# Patient Record
Sex: FEMALE
[De-identification: names, ages, dates, MRNs, and addresses within clinical notes are randomized per-mention and may not be internally consistent; named-entity substitution may affect disease eponyms.]

---

## 2017-11-22 ENCOUNTER — HOSPITAL ENCOUNTER (OUTPATIENT)
Dept: HOSPITAL 14 - H.ENDO | Age: 72
Discharge: HOME | End: 2017-11-22
Attending: INTERNAL MEDICINE
Payer: MEDICARE

## 2017-11-22 VITALS — TEMPERATURE: 97.5 F

## 2017-11-22 VITALS
SYSTOLIC BLOOD PRESSURE: 134 MMHG | OXYGEN SATURATION: 99 % | RESPIRATION RATE: 20 BRPM | HEART RATE: 63 BPM | DIASTOLIC BLOOD PRESSURE: 84 MMHG

## 2017-11-22 VITALS — BODY MASS INDEX: 29.2 KG/M2

## 2017-11-22 DIAGNOSIS — K62.5: Primary | ICD-10-CM

## 2017-11-22 DIAGNOSIS — I10: ICD-10-CM

## 2017-11-22 DIAGNOSIS — D12.3: ICD-10-CM

## 2017-11-22 DIAGNOSIS — D12.7: ICD-10-CM

## 2017-11-22 DIAGNOSIS — D12.2: ICD-10-CM

## 2017-11-22 DIAGNOSIS — D12.5: ICD-10-CM

## 2017-11-22 PROCEDURE — 88305 TISSUE EXAM BY PATHOLOGIST: CPT

## 2017-11-22 PROCEDURE — 45385 COLONOSCOPY W/LESION REMOVAL: CPT

## 2018-11-08 ENCOUNTER — HOSPITAL ENCOUNTER (OUTPATIENT)
Dept: HOSPITAL 31 - C.ER | Age: 73
Setting detail: OBSERVATION
LOS: 1 days | Discharge: HOME | End: 2018-11-09
Attending: FAMILY MEDICINE | Admitting: FAMILY MEDICINE
Payer: MEDICARE

## 2018-11-08 VITALS — BODY MASS INDEX: 29.2 KG/M2

## 2018-11-08 DIAGNOSIS — M81.0: ICD-10-CM

## 2018-11-08 DIAGNOSIS — Z85.3: ICD-10-CM

## 2018-11-08 DIAGNOSIS — E78.5: ICD-10-CM

## 2018-11-08 DIAGNOSIS — F41.9: ICD-10-CM

## 2018-11-08 DIAGNOSIS — Z87.891: ICD-10-CM

## 2018-11-08 DIAGNOSIS — I10: ICD-10-CM

## 2018-11-08 DIAGNOSIS — J44.1: Primary | ICD-10-CM

## 2018-11-08 LAB
ALBUMIN SERPL-MCNC: 4.3 G/DL (ref 3.5–5)
ALBUMIN/GLOB SERPL: 1.5 {RATIO} (ref 1–2.1)
ALT SERPL-CCNC: 32 U/L (ref 9–52)
AST SERPL-CCNC: 25 U/L (ref 14–36)
BASOPHILS # BLD AUTO: 0.1 K/UL (ref 0–0.2)
BASOPHILS NFR BLD: 0.9 % (ref 0–2)
BNP SERPL-MCNC: 47.1 PG/ML (ref 0–900)
BUN SERPL-MCNC: 14 MG/DL (ref 7–17)
CALCIUM SERPL-MCNC: 9.1 MG/DL (ref 8.6–10.4)
EOSINOPHIL # BLD AUTO: 0.8 K/UL (ref 0–0.7)
EOSINOPHIL NFR BLD: 13.8 % (ref 0–4)
ERYTHROCYTE [DISTWIDTH] IN BLOOD BY AUTOMATED COUNT: 14.2 % (ref 11.5–14.5)
GFR NON-AFRICAN AMERICAN: > 60
HGB BLD-MCNC: 14.3 G/DL (ref 11–16)
LYMPHOCYTES # BLD AUTO: 1.6 K/UL (ref 1–4.3)
LYMPHOCYTES NFR BLD AUTO: 28.3 % (ref 20–40)
MCH RBC QN AUTO: 33.2 PG (ref 27–31)
MCHC RBC AUTO-ENTMCNC: 34.1 G/DL (ref 33–37)
MCV RBC AUTO: 97.2 FL (ref 81–99)
MONOCYTES # BLD: 0.6 K/UL (ref 0–0.8)
MONOCYTES NFR BLD: 11.2 % (ref 0–10)
NEUTROPHILS # BLD: 2.5 K/UL (ref 1.8–7)
NEUTROPHILS NFR BLD AUTO: 45.8 % (ref 50–75)
NRBC BLD AUTO-RTO: 0.1 % (ref 0–2)
PLATELET # BLD: 244 K/UL (ref 130–400)
PMV BLD AUTO: 7.9 FL (ref 7.2–11.7)
RBC # BLD AUTO: 4.32 MIL/UL (ref 3.8–5.2)
WBC # BLD AUTO: 5.6 K/UL (ref 4.8–10.8)

## 2018-11-08 PROCEDURE — 94640 AIRWAY INHALATION TREATMENT: CPT

## 2018-11-08 PROCEDURE — 80053 COMPREHEN METABOLIC PANEL: CPT

## 2018-11-08 PROCEDURE — 84484 ASSAY OF TROPONIN QUANT: CPT

## 2018-11-08 PROCEDURE — 85025 COMPLETE CBC W/AUTO DIFF WBC: CPT

## 2018-11-08 PROCEDURE — 93005 ELECTROCARDIOGRAM TRACING: CPT

## 2018-11-08 PROCEDURE — 84100 ASSAY OF PHOSPHORUS: CPT

## 2018-11-08 PROCEDURE — 71045 X-RAY EXAM CHEST 1 VIEW: CPT

## 2018-11-08 PROCEDURE — 83735 ASSAY OF MAGNESIUM: CPT

## 2018-11-08 PROCEDURE — 83880 ASSAY OF NATRIURETIC PEPTIDE: CPT

## 2018-11-08 PROCEDURE — 96374 THER/PROPH/DIAG INJ IV PUSH: CPT

## 2018-11-08 PROCEDURE — 36415 COLL VENOUS BLD VENIPUNCTURE: CPT

## 2018-11-08 PROCEDURE — 99285 EMERGENCY DEPT VISIT HI MDM: CPT

## 2018-11-08 RX ADMIN — METHYLPREDNISOLONE SODIUM SUCCINATE SCH MG: 40 INJECTION, POWDER, FOR SOLUTION INTRAMUSCULAR; INTRAVENOUS at 12:08

## 2018-11-08 RX ADMIN — METHYLPREDNISOLONE SODIUM SUCCINATE SCH MG: 40 INJECTION, POWDER, FOR SOLUTION INTRAMUSCULAR; INTRAVENOUS at 21:42

## 2018-11-08 RX ADMIN — ALBUTEROL SULFATE SCH: 90 AEROSOL, METERED RESPIRATORY (INHALATION) at 09:00

## 2018-11-08 RX ADMIN — METHYLPREDNISOLONE SODIUM SUCCINATE SCH MG: 40 INJECTION, POWDER, FOR SOLUTION INTRAMUSCULAR; INTRAVENOUS at 06:23

## 2018-11-08 NOTE — RAD
Date of service: 



11/08/2018



PROCEDURE:  CHEST RADIOGRAPH, 1 VIEW



HISTORY:

SOB



COMPARISON:

6/12/2015



FINDINGS:



LUNGS:

Clear.  Study markedly rotated towards the right-accentuating left 

infrahilar bronchovascular markings 



PLEURA:

No pneumothorax or pleural fluid seen.



CARDIOVASCULAR:

There is presence of aortic atherosclerotic calcification on x-ray.  

Heart size normal 



No pulmonary venous congestion left subclavian port tip superior vena 

cava



OSSEOUS STRUCTURES:

Cervical apophyseal joint arthrosis.  Thoracic spondylosis.



VISUALIZED UPPER ABDOMEN:

Normal.



OTHER FINDINGS:

None. 



IMPRESSION:

No interval pathology noted.

## 2018-11-08 NOTE — CP.PCM.PN
<Terry Valladares - Last Filed: 11/08/18 19:59>





Subjective





- Date & Time of Evaluation


Date of Evaluation: 11/08/18


Time of Evaluation: 07:42





- Subjective


Subjective: 





Progress note for Hospitalist service 





Patient seen and examined at bedside. She states that she no longer feels short 

of breath but continues to have a cough. She states she would like to go home as

soon as possible since she helps to care for her son. She states that all her 

symptoms with her cough started after she got sick at a wedding in [place].

She states that she has had stress incontinence when she coughs. She denies 

fevers, chills, dizziness, lightheadedness, chest pain, palpitations, abdominal 

pain, nausea, vomiting ,diarrhea, calf pain or swelling. 





Objective





- Vital Signs/Intake and Output


Vital Signs (last 24 hours): 


                                        











Temp Pulse Resp BP Pulse Ox


 


 97.8 F   100 H  20   105/59 L  95 


 


 11/08/18 04:22  11/08/18 05:24  11/08/18 04:22  11/08/18 04:22  11/08/18 05:08











- Medications


Medications: 


                               Current Medications





Albuterol (Ventolin Hfa 90 Mcg/Actuation (8 G))  2 puff INH RQ6 [facility]


Albuterol/Ipratropium (Duoneb 3 Mg/0.5 Mg (3 Ml) Ud)  3 ml INH RQ4 PRN


   PRN Reason: Shortness of Breath


Amlodipine Besylate (Norvasc)  5 mg PO DAILY [facility]


Anastrozole (Arimidex 1 Mg Tab)  1 mg PO DAILY [facility]


Aspirin (Aspirin Chewable)  81 mg PO DAILY [facility]


Cyanocobalamin (Vitamin B12 100 Mcg Tab)  100 mcg PO DAILY [facility]


Fluticasone/Vilanterol (Breo Ellipta 100-25 Mcg Inh)  1 puff INH RQD [facility]


Heparin Sodium (Porcine) (Heparin)  5,000 units SC Q8 [facility]


   Last Admin: 11/08/18 06:24 Dose:  5,000 units


Influenza Virus Vaccine (Fluzone Quad [id number])  60 mcg IM .ONCE ONE


   Stop: 11/10/18 10:01


Lorazepam (Ativan)  1 mg PO Q6H PRN; Taper


   PRN Reason: EtOH Withdrawall


   Stop: 11/13/18 07:59


Methylprednisolone (Solu-Medrol)  40 mg IVP Q6 [facility]


   Last Admin: 11/08/18 06:23 Dose:  40 mg


Montelukast Sodium (Singulair)  10 mg PO HS [facility]


Naproxen (Anaprox)  275 mg PO DAILY PRN


   PRN Reason: Pain, moderate (4-7)


Rosuvastatin Calcium (Crestor)  5 mg PO HS [facility]


Thiamine HCl (Vitamin B1 Tab)  100 mg PO DAILY [facility]


Vitamin B Complex/Folic Acid (Berroca)  1 tab PO DAILY [facility]











- Labs


Labs: 


                                        





                                 11/08/18 01:51 





                                 11/08/18 01:51 











- Constitutional


Appears: No Acute Distress





- Head Exam


Head Exam: ATRAUMATIC, NORMAL INSPECTION





- Eye Exam


Eye Exam: EOMI





- ENT Exam


ENT Exam: Mucous Membranes Dry





- Neck Exam


Neck Exam: Full ROM





- Respiratory Exam


Respiratory Exam: Decreased Breath Sounds.  absent: Rales, Rhonchi, Wheezes, Res

piratory Distress, Stridor





- Cardiovascular Exam


Cardiovascular Exam: REGULAR RHYTHM, +S1, +S2.  absent: Gallop, Rubs, Murmur





- GI/Abdominal Exam


GI & Abdominal Exam: Soft, Normal Bowel Sounds.  absent: Firm, Guarding, Rigid, 

Tenderness, Organomegaly





- Extremities Exam


Extremities Exam: Normal Capillary Refill.  absent: Calf Tenderness, Pedal 

Edema, Tenderness





- Back Exam


Back Exam: absent: CVA tenderness (L), CVA tenderness (R)





- Neurological Exam


Neurological Exam: Alert, Awake, Oriented x3





- Psychiatric Exam


Psychiatric exam: Normal Affect, Normal Mood





- Skin


Skin Exam: Dry, Intact, Warm


Additional comments: 





subdermal port on left upper chest





Assessment and Plan





- Assessment and Plan (Free Text)


Plan: 





Plan: 


73 year old female with PMHx of COPD, HTN, HLD, spinal stenosis, and R breast 

cancer status admitted for COPD exacerbation. 





Shortness of breath, likely COPD Exacerbation


- Duonebs RQ4H PRN


- Solumedrol 40mg IVP Q8H


- Breo Ellipta 1puff daily, home med


- Albuterol HFA 2 puff INH RQ6, home med


- Montelukast 10mg PO HS, home med


- Zithromax 500mg IV Q24 





Hypertension


- Amlodipine 5mg PO daily, home med


- Enalapril 10mg daily, substitute for home med of Benazepril 20mg daily


- ASA 81mg PO





Hyperlipidemia


- Crestor 5mg PO HS, substitute for home med of Atorvastatin 10mg daily





Hx of R Breast Cancer


- anastrozole 1mg daily, home med


- s/p right mastectomy 2014





Hx of Spinal Stenosis


-Naproxen 275mg PO daily PRN





Hx of Alcohol use


-3 wine spritzers daily


-Denies hx of withdrawal


-Lorazepam 1mg IVP Q6H PRN


-Thiamine 100mg PO daily


-Vitamin B complex/Folic acid 1 tab PO daily


-CIWA protocol





Prophylaxis


-Heparin 5000u SC Q8





Case discussed with Dr. Leslie Valladares, PGY1








<Mis Nelson - Last Filed: 11/09/18 16:44>





Objective





- Vital Signs/Intake and Output


Vital Signs (last 24 hours): 


                                        











Temp Pulse Resp BP Pulse Ox


 


 97.6 F   95 H  20   110/70   92 L


 


 11/09/18 08:06  11/09/18 16:03  11/09/18 08:06  11/09/18 11:01  11/09/18 13:04








Intake and Output: 


                                        











 11/09/18 11/09/18





 06:59 18:59


 


Intake Total 500 


 


Balance 500 














- Medications


Medications: 


                               Current Medications





Albuterol (Ventolin Hfa 90 Mcg/Actuation (8 G))  2 puff INH RQ6 [facility]


   Last Admin: 11/09/18 13:40 Dose:  2 puff


Albuterol/Ipratropium (Duoneb 3 Mg/0.5 Mg (3 Ml) Ud)  3 ml INH RQ4 PRN


   PRN Reason: Shortness of Breath


Amlodipine Besylate (Norvasc)  5 mg PO DAILY [facility]


   Last Admin: 11/09/18 10:58 Dose:  5 mg


Anastrozole (Arimidex 1 Mg Tab)  1 mg PO DAILY [facility]


   Last Admin: 11/09/18 11:04 Dose:  1 mg


Aspirin (Aspirin Chewable)  81 mg PO DAILY [facility]


   Last Admin: 11/09/18 10:59 Dose:  81 mg


Cyanocobalamin (Vitamin B12 100 Mcg Tab)  100 mcg PO DAILY [facility]


   Last Admin: 11/09/18 10:58 Dose:  100 mcg


Enalapril Maleate (Vasotec)  10 mg PO DAILY [facility]


   Last Admin: 11/09/18 11:01 Dose:  Not Given


Fluticasone/Vilanterol (Breo Ellipta 100-25 Mcg Inh)  1 puff INH RQD [facility]


   Last Admin: 11/09/18 07:58 Dose:  1 puff


Heparin Sodium (Porcine) (Heparin)  5,000 units SC Q8 [facility]


   Last Admin: 11/09/18 14:12 Dose:  5,000 units


Azithromycin (Zithromax 500mg In Ns Addvantage)  500 mg in 250 mls @ 167 mls/hr 

IVPB Q24H [facility]; Protocol


   Last Admin: 11/08/18 18:34 Dose:  167 mls/hr


Lorazepam (Ativan)  1 mg PO Q6 PRN; Taper


   PRN Reason: EtOH Withdrawall


   Stop: 11/13/18 07:59


Methylprednisolone (Solu-Medrol)  40 mg IVP Q8 [facility]


   Last Admin: 11/09/18 14:12 Dose:  40 mg


Montelukast Sodium (Singulair)  10 mg PO HS [facility]


   Last Admin: 11/08/18 21:41 Dose:  10 mg


Naproxen (Anaprox)  275 mg PO DAILY [facility]


   PRN Reason: Pain, moderate (4-7)


Rosuvastatin Calcium (Crestor)  5 mg PO HS [facility]


   Last Admin: 11/08/18 21:41 Dose:  5 mg


Thiamine HCl (Vitamin B1 Tab)  100 mg PO DAILY [facility]


   Last Admin: 11/09/18 10:59 Dose:  100 mg


Vitamin B Complex/Folic Acid (Berroca)  1 tab PO DAILY [facility]


   Last Admin: 11/09/18 10:58 Dose:  1 tab











- Labs


Labs: 


                                        





                                 11/09/18 07:36 





                                 11/09/18 07:36 











Attending/Attestation





- Attestation


I have personally seen and examined this patient.: Yes


I have fully participated in the care of the patient.: Yes


I have reviewed all pertinent clinical information, including history, physical 

exam and plan: Yes


Notes (Text): 


seen and examined. COPD exacerbation


continue antibiotics and solu medrol


patient is hypoxic off oxygen


continue oxygen and bronchodilators


I agree with the resident's documentation

## 2018-11-08 NOTE — CP.PCM.HP
<Roxy Rai P - Last Filed: 11/08/18 10:10>





History of Present Illness





- History of Present Illness


History of Present Illness: 


H&P for hospitalist service:





HPI:73 year old female with PMHx of COPD, HTN, HLD, and R breast cancer presents

to ED with complaint of cough for the past month after exposure to a family 

member with a URI. Cough is productive, initially green sputum, now clear. Cough

worsens with laying down and on exertion. Associated symptoms include headache, 

post-tussive emesis, and bilateral leg weakness. Patient states she is 

chronically short of breath. Patient has been to her PMD twice in the past month

for these symptoms and has been prescribed nebulizer treatments and cough syrup,

as well as tried numerous OTC medication without improvement. Patient denies fev

er, chills, diaphoresis, dizziness, chest pain, palpitations, cough, and 

abdominal pain.





PMHx: COPD, HTN, HLD, R breast cancer s/p mastectomy, spinal stenosis


PSHx: R mastectomy 2014, cataract surgery b/l


Meds: Atorvastatin 10mg once daily, Montelukast 10mg once a day, 

amlodipine/Benazepril 5/50mg once daily, Anastrozole 10mg PO daily, Breo Ellipta

110mcg/25 mcg one puff daily, Ventalin inhaler 2puffs Q6H, L-Lysine 100mg, B 

complex vatamin, Vitamin D 800mg daily, Magnesium 200mg daily, Ecotrin 81mg 

Daily, Vatamin D 25mg daily, Aleve gels 440mg once daily for pain. 


Allergies: NKDA


Family Hx: Breast cancer in mother and sisters, 


Social: former smoker 1ppd for 55 years, quit 4 years ago. Drinks 3 wine 

spritzers daily, denies drug use. Retired. Walks with a walker.


PMD: Dr. TUTU Gama


Code status: Patient states she would like everything done only if there is a 

chance of recovery.


Proxy: SonDeangelo- number unknown. 








Present on Admission





- Present on Admission


Any Indicators Present on Admission: No





Review of Systems





- Constitutional


Constitutional: Headache, Weakness (bilateral leg).  absent: Chills, Excessive 

Sweating, Fever, Night Sweats





- EENT


Eyes: absent: Change in Vision, Irritation


Nose/Mouth/Throat: Dry Mouth.  absent: Nasal Congestion, Nasal Discharge, 

Bleeding Gums





- Cardiovascular


Cardiovascular: absent: Chest Pain, Chest Pain at Rest, Claudication, Pain 

Radiating to Arm/Neck/Jaw, Leg Edema





- Respiratory


Respiratory: Cough, Dyspnea, Dyspnea on Exertion.  absent: Hemoptysis





- Gastrointestinal


Gastrointestinal: Hematochezia.  absent: Bloating, Change in Bowel Habits, 

Cramping, Diarrhea, Loose Stools





- Genitourinary


Genitourinary: Urinary Incontinence (chronic).  absent: Difficulty Urinating, 

Dysuria, Hematuria





- Musculoskeletal


Musculoskeletal: absent: Joint Swelling, Myalgias, Numbness





- Integumentary


Integumentary: absent: Rash, Unusual Bruising, Jaundice





- Neurological


Neurological: absent: Abnormal Hearing, Abnormal Speech, Dizziness, Numbness, 

Focal Weakness, Headaches, Loss of Vision





Past Patient History





- Infectious Disease


Hx of Infectious Diseases: None





- Past Medical History & Family History


Past Medical History?: Yes





- Past Social History


Smoking Status: Never Smoked





- CARDIAC


Hx Hypercholesterolemia: Yes


Hx Hypertension: Yes


Hx Peripheral Edema: Yes





- PULMONARY


Hx Asthma: Yes


Hx Chronic Obstructive Pulmonary Disease (COPD): Yes


Hx Pneumonia: Yes (1964)





- NEUROLOGICAL


Hx Neurological Disorder: No





- HEENT


Hx HEENT Problems: No





- RENAL


Hx Chronic Kidney Disease: No





- ENDOCRINE/METABOLIC


Hx Endocrine Disorders: No





- HEMATOLOGICAL/ONCOLOGICAL


Hx Cancer: Yes (RT BREAST CA WITH CHEMO AND RADIATION)





- INTEGUMENTARY


Hx Dermatological Problems: No





- MUSCULOSKELETAL/RHEUMATOLOGICAL


Hx Fractures: Yes (hairline fx. foot no surgery)


Hx Osteoporosis: Yes





- GASTROINTESTINAL


Hx Gastrointestinal Disorders: No





- GENITOURINARY/GYNECOLOGICAL


Hx Genitourinary Disorders: No





- PSYCHIATRIC


Hx Anxiety: Yes


Hx Substance Use: No





- SURGICAL HISTORY


Hx Surgeries: Yes


Hx Mastectomy: Yes (RT)





- ANESTHESIA


Hx Anesthesia: Yes


Hx Anesthesia Reactions: No


Hx Malignant Hyperthermia: No





Meds


Allergies/Adverse Reactions: 


                                    Allergies











Allergy/AdvReac Type Severity Reaction Status Date / Time


 


No Known Allergies Allergy   Verified 03/28/14 13:47














Physical Exam





- Constitutional


Appears: Non-toxic, No Acute Distress, Other (smells of alcohol)





- Head Exam


Head Exam: ATRAUMATIC, NORMOCEPHALIC





- Eye Exam


Eye Exam: EOMI, Normal appearance, PERRL





- ENT Exam


ENT Exam: Mucous Membranes Dry





- Neck Exam


Neck exam: Positive for: Full Rom, Normal Inspection





- Respiratory Exam


Respiratory Exam: Decreased Breath Sounds.  absent: Rales, Rhonchi, Wheezes, 

Respiratory Distress





- Cardiovascular Exam


Cardiovascular Exam: RRR, +S1, +S2.  absent: Systolic Murmur





- GI/Abdominal Exam


GI & Abdominal Exam: Normal Bowel Sounds, Soft.  absent: Guarding, Rigid, 

Tenderness





- Extremities Exam


Extremities exam: Positive for: normal capillary refill, pedal edema (trace pit

ting edema bilater LE), pedal pulses present.  Negative for: calf tenderness, 

tenderness





- Neurological Exam


Neurological exam: Alert, CN II-XII Intact, Oriented x3





- Psychiatric Exam


Psychiatric exam: Normal Affect, Normal Mood





- Skin


Skin Exam: Dry, Intact, Normal Color, Warm





Results





- Vital Signs


Recent Vital Signs: 





                                Last Vital Signs











Temp  97.8 F   11/08/18 04:22


 


Pulse  91 H  11/08/18 04:22


 


Resp  20   11/08/18 04:22


 


BP  105/59 L  11/08/18 04:22


 


Pulse Ox  97   11/08/18 04:34














- Labs


Result Diagrams: 


                                 11/08/18 01:51





                                 11/08/18 01:51


Labs: 





                         Laboratory Results - last 24 hr











  11/08/18 11/08/18





  01:51 01:51


 


WBC  5.6 


 


RBC  4.32 


 


Hgb  14.3 


 


Hct  42.0 


 


MCV  97.2 


 


MCH  33.2 H 


 


MCHC  34.1 


 


RDW  14.2 


 


Plt Count  244 


 


MPV  7.9 


 


Neut % (Auto)  45.8 L 


 


Lymph % (Auto)  28.3 


 


Mono % (Auto)  11.2 H 


 


Eos % (Auto)  13.8 H 


 


Baso % (Auto)  0.9 


 


Neut # (Auto)  2.5 


 


Lymph # (Auto)  1.6 


 


Mono # (Auto)  0.6 


 


Eos # (Auto)  0.8 H 


 


Baso # (Auto)  0.1 


 


Sodium   142


 


Potassium   4.3


 


Chloride   105


 


Carbon Dioxide   25


 


Anion Gap   16


 


BUN   14


 


Creatinine   0.7


 


Est GFR ( Amer)   > 60


 


Est GFR (Non-Af Amer)   > 60


 


Random Glucose   100


 


Calcium   9.1


 


Magnesium   2.1


 


Total Bilirubin   1.2


 


AST   25


 


ALT   32


 


Alkaline Phosphatase   91


 


Troponin I   < 0.0120


 


NT-Pro-B Natriuret Pep   47.1


 


Total Protein   7.2


 


Albumin   4.3


 


Globulin   2.9


 


Albumin/Globulin Ratio   1.5














Assessment & Plan





- Assessment and Plan (Free Text)


Plan: 


73 year old female with PMHx of COPD, HTN, HLD, spinal stenosis, and R breast 

cancer admitted for COPD exacerbation. 





COPD Exacerbation


-Duonebs RQ4H PRN


-Methylprednisolone 40mg IVP Q6H


-Breo Ellipta 1puff daily, home med


-Albuterol HFA 2 puff INH RQ6, home med


-Montelukast 10mg PO HS, home med





Hypertension


-Amlodipine 5mg PO daily, home med


-Enalopril 10mg daily, substitute for home med of Benazepril 20mg daily





Hyperlipidemia


-Crestor 5mg PO HS, substitute for home med of Atorvastatin 10mg daily





Hx of R Breast Cancer


-anastrozole 1mg daily, home med


-s/p mastectomy 2014





Hx of Spinal Stenosis


-Naproxen 275mg PO daily PRN





Hx of Alcohol use


-3 wine spritzers daily


-Denies hx of withdrawal


-Lorazepam 1mg IVP Q6H PRN


-Thiamine 100mg PO daily


-Vitamin B complex/Folic acid 1 tab PO daily


-CIWA





Prophylaxis


-Heparin 5000u SC Q8


-cyanocobalamin 100mcg daily, home med


-Aspirin 81mg daily, home med





Case discussed with attending, Dr. Osborne. 


























<Silvestre Osborne - Last Filed: 11/10/18 19:15>





Results





- Vital Signs


Recent Vital Signs: 





                                Last Vital Signs











Temp  98.5 F   11/09/18 16:42


 


Pulse  85   11/09/18 16:42


 


Resp  20   11/09/18 16:42


 


BP  128/76   11/09/18 16:42


 


Pulse Ox  93 L  11/09/18 16:42














- Labs


Result Diagrams: 


                                 11/09/18 07:36





                                 11/09/18 07:36





Assessment & Plan





- Date & Time


Date: 11/10/18 (I have seen and examined the patient.  I agree with the findings

and plan of care as documented by Dr. Rai.  Patient with COPD exacerbation. 

Continue home meds.  Solumedrol.  Nebs.  Oxygen as needed.  Monitor for acute 

changes.)


Time: 19:14





Attending/Attestation





- Attestation


I have personally seen and examined this patient.: Yes


I have fully participated in the care of the patient.: Yes


I have reviewed all pertinent clinical information: Yes

## 2018-11-09 VITALS
DIASTOLIC BLOOD PRESSURE: 76 MMHG | SYSTOLIC BLOOD PRESSURE: 128 MMHG | TEMPERATURE: 98.5 F | HEART RATE: 85 BPM | OXYGEN SATURATION: 93 %

## 2018-11-09 VITALS — RESPIRATION RATE: 20 BRPM

## 2018-11-09 LAB
ALBUMIN SERPL-MCNC: 3.8 G/DL (ref 3.5–5)
ALBUMIN/GLOB SERPL: 1.4 {RATIO} (ref 1–2.1)
ALT SERPL-CCNC: 33 U/L (ref 9–52)
AST SERPL-CCNC: 17 U/L (ref 14–36)
BASOPHILS # BLD AUTO: 0 K/UL (ref 0–0.2)
BASOPHILS NFR BLD: 0.3 % (ref 0–2)
BUN SERPL-MCNC: 17 MG/DL (ref 7–17)
CALCIUM SERPL-MCNC: 8.9 MG/DL (ref 8.6–10.4)
EOSINOPHIL # BLD AUTO: 0 K/UL (ref 0–0.7)
EOSINOPHIL NFR BLD: 0 % (ref 0–4)
ERYTHROCYTE [DISTWIDTH] IN BLOOD BY AUTOMATED COUNT: 14.3 % (ref 11.5–14.5)
GFR NON-AFRICAN AMERICAN: > 60
HGB BLD-MCNC: 13.6 G/DL (ref 11–16)
LYMPHOCYTE: 8 % (ref 20–40)
LYMPHOCYTES # BLD AUTO: 0.8 K/UL (ref 1–4.3)
LYMPHOCYTES NFR BLD AUTO: 8.7 % (ref 20–40)
MCH RBC QN AUTO: 32.5 PG (ref 27–31)
MCHC RBC AUTO-ENTMCNC: 33.8 G/DL (ref 33–37)
MCV RBC AUTO: 96.1 FL (ref 81–99)
MONOCYTE: 6 % (ref 0–10)
MONOCYTES # BLD: 0.4 K/UL (ref 0–0.8)
MONOCYTES NFR BLD: 4.7 % (ref 0–10)
NEUTROPHILS # BLD: 7.7 K/UL (ref 1.8–7)
NEUTROPHILS NFR BLD AUTO: 86 % (ref 50–75)
NEUTROPHILS NFR BLD AUTO: 86.3 % (ref 50–75)
NRBC BLD AUTO-RTO: 0 % (ref 0–2)
PLATELET # BLD EST: NORMAL 10*3/UL
PLATELET # BLD: 229 K/UL (ref 130–400)
PMV BLD AUTO: 8.7 FL (ref 7.2–11.7)
RBC # BLD AUTO: 4.18 MIL/UL (ref 3.8–5.2)
RBC MORPH BLD: NORMAL
TOTAL CELLS COUNTED BLD: 100
WBC # BLD AUTO: 8.9 K/UL (ref 4.8–10.8)

## 2018-11-09 RX ADMIN — ALBUTEROL SULFATE SCH PUFF: 90 AEROSOL, METERED RESPIRATORY (INHALATION) at 13:40

## 2018-11-09 RX ADMIN — ALBUTEROL SULFATE SCH PUFF: 90 AEROSOL, METERED RESPIRATORY (INHALATION) at 07:59

## 2018-11-09 RX ADMIN — ALBUTEROL SULFATE SCH PUFF: 90 AEROSOL, METERED RESPIRATORY (INHALATION) at 01:46

## 2018-11-09 RX ADMIN — METHYLPREDNISOLONE SODIUM SUCCINATE SCH MG: 40 INJECTION, POWDER, FOR SOLUTION INTRAMUSCULAR; INTRAVENOUS at 14:12

## 2018-11-09 RX ADMIN — METHYLPREDNISOLONE SODIUM SUCCINATE SCH MG: 40 INJECTION, POWDER, FOR SOLUTION INTRAMUSCULAR; INTRAVENOUS at 05:39

## 2018-11-09 NOTE — CP.PCM.DIS
<Terry Valladares - Last Filed: 11/09/18 20:07>





Provider





- Provider


Date of Admission: 


11/08/18 04:32





Attending physician: 


Silvestre Osborne MD





Primary care physician: 


Mainor Gama MD





Time Spent in preparation of Discharge (in minutes): 35





Hospital Course





- Lab Results


Lab Results: 


                             Most Recent Lab Values











WBC  8.9 K/uL (4.8-10.8)  D 11/09/18  07:36    


 


RBC  4.18 Mil/uL (3.80-5.20)   11/09/18  07:36    


 


Hgb  13.6 g/dL (11.0-16.0)   11/09/18  07:36    


 


Hct  40.2 % (34.0-47.0)   11/09/18  07:36    


 


MCV  96.1 fL (81.0-99.0)   11/09/18  07:36    


 


MCH  32.5 pg (27.0-31.0)  H  11/09/18  07:36    


 


MCHC  33.8 g/dL (33.0-37.0)   11/09/18  07:36    


 


RDW  14.3 % (11.5-14.5)   11/09/18  07:36    


 


Plt Count  229 K/uL (130-400)   11/09/18  07:36    


 


MPV  8.7 fL (7.2-11.7)   11/09/18  07:36    


 


Neut % (Auto)  86.3 % (50.0-75.0)  H  11/09/18  07:36    


 


Lymph % (Auto)  8.7 % (20.0-40.0)  L  11/09/18  07:36    


 


Mono % (Auto)  4.7 % (0.0-10.0)   11/09/18  07:36    


 


Eos % (Auto)  0.0 % (0.0-4.0)   11/09/18  07:36    


 


Baso % (Auto)  0.3 % (0.0-2.0)   11/09/18  07:36    


 


Neut # (Auto)  7.7 K/uL (1.8-7.0)  H  11/09/18  07:36    


 


Lymph # (Auto)  0.8 K/uL (1.0-4.3)  L  11/09/18  07:36    


 


Mono # (Auto)  0.4 K/uL (0.0-0.8)   11/09/18  07:36    


 


Eos # (Auto)  0.0 K/uL (0.0-0.7)   11/09/18  07:36    


 


Baso # (Auto)  0.0 K/uL (0.0-0.2)   11/09/18  07:36    


 


Neutrophils % (Manual)  86 % (50-75)  H  11/09/18  07:36    


 


Lymphocytes % (Manual)  8 % (20-40)  L  11/09/18  07:36    


 


Monocytes % (Manual)  6 % (0-10)   11/09/18  07:36    


 


Platelet Estimate  Normal  (NORMAL)   11/09/18  07:36    


 


RBC Morphology  Normal   11/09/18  07:36    


 


Sodium  137 mmol/L (132-148)   11/09/18  07:36    


 


Potassium  4.0 mmol/L (3.6-5.2)   11/09/18  07:36    


 


Chloride  106 mmol/L ()   11/09/18  07:36    


 


Carbon Dioxide  22 mmol/L (22-30)   11/09/18  07:36    


 


Anion Gap  13  (10-20)   11/09/18  07:36    


 


BUN  17 mg/dL (7-17)   11/09/18  07:36    


 


Creatinine  0.6 mg/dL (0.7-1.2)  L  11/09/18  07:36    


 


Est GFR ( Amer)  > 60   11/09/18  07:36    


 


Est GFR (Non-Af Amer)  > 60   11/09/18  07:36    


 


Random Glucose  145 mg/dL ()  H  11/09/18  07:36    


 


Calcium  8.9 mg/dl (8.6-10.4)   11/09/18  07:36    


 


Phosphorus  4.0 mg/dL (2.5-4.5)   11/09/18  07:36    


 


Magnesium  2.3 mg/dL (1.6-2.3)   11/09/18  07:36    


 


Total Bilirubin  1.1 mg/dL (0.2-1.3)   11/09/18  07:36    


 


AST  17 U/L (14-36)   11/09/18  07:36    


 


ALT  33 U/L (9-52)   11/09/18  07:36    


 


Alkaline Phosphatase  92 U/L ()   11/09/18  07:36    


 


Troponin I  < 0.0120 ng/mL (0.00-0.120)   11/08/18  01:51    


 


NT-Pro-B Natriuret Pep  47.1 pg/mL (0-900)   11/08/18  01:51    


 


Total Protein  6.4 g/dL (6.3-8.3)   11/09/18  07:36    


 


Albumin  3.8 g/dL (3.5-5.0)   11/09/18  07:36    


 


Globulin  2.6 gm/dL (2.2-3.9)   11/09/18  07:36    


 


Albumin/Globulin Ratio  1.4  (1.0-2.1)   11/09/18  07:36    














- Hospital Course


Hospital Course: 





On admission: 


HPI:73 year old female with PMHx of COPD, HTN, HLD, and R breast cancer presents

to ED with complaint of cough for the past month after exposure to a family 

member with a URI. Cough is productive, initially green sputum, now clear. Cough

worsens with laying down and on exertion. Associated symptoms include headache, 

post-tussive emesis, and bilateral leg weakness. Patient states she is 

chronically short of breath. Patient has been to her PMD twice in the past month

for these symptoms and has been prescribed nebulizer treatments and cough syrup,

as well as tried numerous OTC medication without improvement. Patient denies 

fever, chills, diaphoresis, dizziness, chest pain, palpitations, cough, and 

abdominal pain.





Hospital course: 


Patient was admitted for evaluation of shortness of breath, likely COPD 

exacerbation. Patient was given Solumedrol, Duonebs, Breo/Ellipta, Montelukast, 

Zithromax with improvement of her symptoms. She remained afebrile and was not 

noted to have elevated white count. BNP was not elevated at 47, and troponin x1 

was negative. On discharge, she was able to walk without experiencing shortness 

of breath or drop in oxygen saturation. For her history of hypertension, she was

continued on her home dose of Amlodipine and Enalapril. She also received ASA 

81mg daily. She was treated with Crestor for her hyperlipidemia. Given her 

history of breast cancer, she was continued on Anastrozole. Patient admitted to 

drinking 3 wine spritzers daily, however did not experience any withdrawal 

symptoms during hospitalization. Patient was placed on CIWA protocol and given 

Thiamine, Folic acid. On discharge, patient was medically stable and stated she 

no longer experienced shortness of breath or chest pain. 





Discharge instructions: 


Please follow up with your primary care provider Dr. TUTU Gama in 2 weeks for 

further evaluation. You may continue your home medications. Please return to the

ED if you experience chest pain, shortness of breath, palpitations. Please 

follow up with your Oncologist Dr. Stacy for your history of breast 

cancer. 





Prescriptions: 


Zithromax 500mg by mouth once daily for 5 days. Disp # 5 (five)


Prednisone 20mg by mouth once daily for 5 days. Disp #5 (five)





Discharge Exam





- Head Exam


Head Exam: ATRAUMATIC, NORMAL INSPECTION





- Eye Exam


Eye Exam: EOMI





- ENT Exam


ENT Exam: Mucous Membranes Moist





- Respiratory Exam


Respiratory Exam: Decreased Breath Sounds.  absent: Rales, Rhonchi, Wheezes, 

Respiratory Distress, Stridor


Additional comments: 





subdermal port in left chest wall





- Cardiovascular Exam


Cardiovascular Exam: REGULAR RHYTHM, +S1, +S2.  absent: Gallop, Rubs, Systolic 

Murmur





- GI/Abdominal Exam


GI & Abdominal Exam: Normal Bowel Sounds, Soft.  absent: Distended, Firm, 

Guarding, Hernia, Rigid, Tenderness





- Extremities Exam


Extremities exam: normal capillary refill, pedal pulses present


Additional comments: 





no calf tenderness, no pedal edema





- Neurological Exam


Neurological exam: Alert, CN II-XII Intact, Oriented x3





- Psychiatric Exam


Psychiatric exam: Normal Affect, Normal Mood





- Skin


Skin Exam: Dry, Intact, Warm





Discharge Plan





- Discharge Medications


Prescriptions: 


Azithromycin [Zithromax] 500 mg PO DAILY 5 Days #5 tablet


RX: predniSONE [predniSONE Tab] 20 mg PO DAILY #5 tab





- Follow Up Plan


Condition: STABLE


Disposition: HOME/ ROUTINE


Instructions:  Azithromycin (Systemic), Acute Bronchitis, Adult (DC), COPD 

Including Emphysema (DC), Anxiety, Adult (DC), Prednisone


Additional Instructions: 


Please follow up with your primary care provider Dr. TUTU Gama in 2 weeks for 

further evaluation. You may continue your home medications. Please return to the

ED if you experience chest pain, shortness of breath, palpitations. Please 

follow up with your Oncologist Dr. Stacy for your history of breast 

cancer. 





Prescriptions: 


Zithromax 500mg by mouth once daily for 5 days. Disp # 5 (five)


Prednisone 20mg by mouth once daily for 5 days. Disp #5 (five)


Referrals: 


Mainor Gama MD [Primary Care Provider] - 





<Mis Nelson - Last Filed: 11/10/18 15:43>





Provider





- Provider


Date of Admission: 


11/08/18 04:32





Attending physician: 


Silvestre Osborne MD





Primary care physician: 


Mainor Gama MD








Hospital Course





- Lab Results


Lab Results: 


                             Most Recent Lab Values











WBC  8.9 K/uL (4.8-10.8)  D 11/09/18  07:36    


 


RBC  4.18 Mil/uL (3.80-5.20)   11/09/18  07:36    


 


Hgb  13.6 g/dL (11.0-16.0)   11/09/18  07:36    


 


Hct  40.2 % (34.0-47.0)   11/09/18  07:36    


 


MCV  96.1 fL (81.0-99.0)   11/09/18  07:36    


 


MCH  32.5 pg (27.0-31.0)  H  11/09/18  07:36    


 


MCHC  33.8 g/dL (33.0-37.0)   11/09/18  07:36    


 


RDW  14.3 % (11.5-14.5)   11/09/18  07:36    


 


Plt Count  229 K/uL (130-400)   11/09/18  07:36    


 


MPV  8.7 fL (7.2-11.7)   11/09/18  07:36    


 


Neut % (Auto)  86.3 % (50.0-75.0)  H  11/09/18  07:36    


 


Lymph % (Auto)  8.7 % (20.0-40.0)  L  11/09/18  07:36    


 


Mono % (Auto)  4.7 % (0.0-10.0)   11/09/18  07:36    


 


Eos % (Auto)  0.0 % (0.0-4.0)   11/09/18  07:36    


 


Baso % (Auto)  0.3 % (0.0-2.0)   11/09/18  07:36    


 


Neut # (Auto)  7.7 K/uL (1.8-7.0)  H  11/09/18  07:36    


 


Lymph # (Auto)  0.8 K/uL (1.0-4.3)  L  11/09/18  07:36    


 


Mono # (Auto)  0.4 K/uL (0.0-0.8)   11/09/18  07:36    


 


Eos # (Auto)  0.0 K/uL (0.0-0.7)   11/09/18  07:36    


 


Baso # (Auto)  0.0 K/uL (0.0-0.2)   11/09/18  07:36    


 


Neutrophils % (Manual)  86 % (50-75)  H  11/09/18  07:36    


 


Lymphocytes % (Manual)  8 % (20-40)  L  11/09/18  07:36    


 


Monocytes % (Manual)  6 % (0-10)   11/09/18  07:36    


 


Platelet Estimate  Normal  (NORMAL)   11/09/18  07:36    


 


RBC Morphology  Normal   11/09/18  07:36    


 


Sodium  137 mmol/L (132-148)   11/09/18  07:36    


 


Potassium  4.0 mmol/L (3.6-5.2)   11/09/18  07:36    


 


Chloride  106 mmol/L ()   11/09/18  07:36    


 


Carbon Dioxide  22 mmol/L (22-30)   11/09/18  07:36    


 


Anion Gap  13  (10-20)   11/09/18  07:36    


 


BUN  17 mg/dL (7-17)   11/09/18  07:36    


 


Creatinine  0.6 mg/dL (0.7-1.2)  L  11/09/18  07:36    


 


Est GFR ( Amer)  > 60   11/09/18  07:36    


 


Est GFR (Non-Af Amer)  > 60   11/09/18  07:36    


 


Random Glucose  145 mg/dL ()  H  11/09/18  07:36    


 


Calcium  8.9 mg/dl (8.6-10.4)   11/09/18  07:36    


 


Phosphorus  4.0 mg/dL (2.5-4.5)   11/09/18  07:36    


 


Magnesium  2.3 mg/dL (1.6-2.3)   11/09/18  07:36    


 


Total Bilirubin  1.1 mg/dL (0.2-1.3)   11/09/18  07:36    


 


AST  17 U/L (14-36)   11/09/18  07:36    


 


ALT  33 U/L (9-52)   11/09/18  07:36    


 


Alkaline Phosphatase  92 U/L ()   11/09/18  07:36    


 


Troponin I  < 0.0120 ng/mL (0.00-0.120)   11/08/18  01:51    


 


NT-Pro-B Natriuret Pep  47.1 pg/mL (0-900)   11/08/18  01:51    


 


Total Protein  6.4 g/dL (6.3-8.3)   11/09/18  07:36    


 


Albumin  3.8 g/dL (3.5-5.0)   11/09/18  07:36    


 


Globulin  2.6 gm/dL (2.2-3.9)   11/09/18  07:36    


 


Albumin/Globulin Ratio  1.4  (1.0-2.1)   11/09/18  07:36    














Attending/Attestation





- Attestation


I have personally seen and examined this patient.: Yes


I have fully participated in the care of the patient.: Yes


I have reviewed all pertinent clinical information, including history, physical 

exam and plan: Yes

## 2018-11-09 NOTE — CP.PCM.PN
Subjective





- Date & Time of Evaluation


Date of Evaluation: 11/09/18


Time of Evaluation: 09:24





Objective





- Vital Signs/Intake and Output


Vital Signs (last 24 hours): 


                                        











Temp Pulse Resp BP Pulse Ox


 


 97.6 F   76   20   108/66   95 


 


 11/09/18 08:06  11/09/18 08:06  11/09/18 08:06  11/09/18 08:06  11/09/18 08:06








Intake and Output: 


                                        











 11/09/18 11/09/18





 06:59 18:59


 


Intake Total 500 


 


Balance 500 














- Medications


Medications: 


                               Current Medications





Albuterol (Ventolin Hfa 90 Mcg/Actuation (8 G))  2 puff INH RQ6 [facility]


   Last Admin: 11/09/18 07:59 Dose:  2 puff


Albuterol/Ipratropium (Duoneb 3 Mg/0.5 Mg (3 Ml) Ud)  3 ml INH RQ4 PRN


   PRN Reason: Shortness of Breath


Amlodipine Besylate (Norvasc)  5 mg PO DAILY [facility]


   Last Admin: 11/08/18 10:59 Dose:  5 mg


Anastrozole (Arimidex 1 Mg Tab)  1 mg PO DAILY [facility]


   Last Admin: 11/08/18 13:07 Dose:  1 mg


Aspirin (Aspirin Chewable)  81 mg PO DAILY [facility]


   Last Admin: 11/08/18 10:59 Dose:  81 mg


Cyanocobalamin (Vitamin B12 100 Mcg Tab)  100 mcg PO DAILY [facility]


   Last Admin: 11/08/18 10:59 Dose:  100 mcg


Enalapril Maleate (Vasotec)  10 mg PO DAILY [facility]


   Last Admin: 11/08/18 10:59 Dose:  10 mg


Fluticasone/Vilanterol (Breo Ellipta 100-25 Mcg Inh)  1 puff INH RQD [facility]


   Last Admin: 11/09/18 07:58 Dose:  1 puff


Heparin Sodium (Porcine) (Heparin)  5,000 units SC Q8 [facility]


   Last Admin: 11/09/18 05:38 Dose:  5,000 units


Azithromycin (Zithromax 500mg In Ns Addvantage)  500 mg in 250 mls @ 167 mls/hr 

IVPB Q24H [facility]; Protocol


   Last Admin: 11/08/18 18:34 Dose:  167 mls/hr


Influenza Virus Vaccine (Fluzone Quad [id number])  60 mcg IM .ONCE ONE


   Stop: 11/10/18 10:01


Lorazepam (Ativan)  1 mg PO Q6 PRN; Taper


   PRN Reason: EtOH Withdrawall


   Stop: 11/13/18 07:59


Methylprednisolone (Solu-Medrol)  40 mg IVP Q8 [facility]


   Last Admin: 11/09/18 05:39 Dose:  40 mg


Montelukast Sodium (Singulair)  10 mg PO HS [facility]


   Last Admin: 11/08/18 21:41 Dose:  10 mg


Naproxen (Anaprox)  275 mg PO DAILY PRN


   PRN Reason: Pain, moderate (4-7)


Rosuvastatin Calcium (Crestor)  5 mg PO HS [facility]


   Last Admin: 11/08/18 21:41 Dose:  5 mg


Thiamine HCl (Vitamin B1 Tab)  100 mg PO DAILY [facility]


   Last Admin: 11/08/18 10:59 Dose:  100 mg


Vitamin B Complex/Folic Acid (Berroca)  1 tab PO DAILY [facility]


   Last Admin: 11/08/18 10:59 Dose:  1 tab











- Labs


Labs: 


                                        





                                 11/09/18 07:36 





                                 11/09/18 07:36

## 2018-11-09 NOTE — CARD
--------------- APPROVED REPORT --------------





Date of service: 11/08/2018



EKG Measurement

Heart [id number]

MI [id number]

[id number]

[id number]

[id number]



<Conclusion>

Normal sinus rhythm

Normal ECG

## 2019-04-17 ENCOUNTER — HOSPITAL ENCOUNTER (OUTPATIENT)
Dept: HOSPITAL 31 - C.PAT | Age: 74
End: 2019-04-17
Payer: MEDICARE

## 2019-04-17 DIAGNOSIS — R19.00: Primary | ICD-10-CM

## 2019-04-24 ENCOUNTER — HOSPITAL ENCOUNTER (OUTPATIENT)
Dept: HOSPITAL 31 - C.SDS | Age: 74
Discharge: HOME | End: 2019-04-24
Attending: SURGERY
Payer: MEDICARE

## 2019-04-24 VITALS
TEMPERATURE: 98 F | SYSTOLIC BLOOD PRESSURE: 118 MMHG | HEART RATE: 76 BPM | DIASTOLIC BLOOD PRESSURE: 68 MMHG | OXYGEN SATURATION: 96 %

## 2019-04-24 VITALS — RESPIRATION RATE: 18 BRPM

## 2019-04-24 VITALS — BODY MASS INDEX: 31.1 KG/M2

## 2019-04-24 DIAGNOSIS — I10: ICD-10-CM

## 2019-04-24 DIAGNOSIS — C79.2: Primary | ICD-10-CM

## 2019-04-24 DIAGNOSIS — J45.909: ICD-10-CM

## 2019-04-24 DIAGNOSIS — Z85.3: ICD-10-CM

## 2019-04-24 PROCEDURE — 88307 TISSUE EXAM BY PATHOLOGIST: CPT

## 2019-04-24 PROCEDURE — 11604 EXC TR-EXT MAL+MARG 3.1-4 CM: CPT

## 2019-04-24 PROCEDURE — 13101 CMPLX RPR TRUNK 2.6-7.5 CM: CPT

## 2019-04-24 NOTE — PCM.SURG1
Surgeon's Initial Post Op Note





- Surgeon's Notes


Surgeon: Dr. Griffith


Assistant: Tatianna Gunter


Type of Anesthesia: Local


Pre-Operative Diagnosis: abdominal wall mass


Operative Findings: see opertive report


Post-Operative Diagnosis: abdominal wall mass


Operation Performed: wide local excision of abdominal wall mass


Specimen/Specimens Removed: abdominal wall mass


Estimated Blood Loss: EBL {In ML}: 10


Blood Products Given: N/A


Drains Used: No Drains


Post-Op Condition: Good


Date of Surgery/Procedure: 04/24/19


Time of Surgery/Procedure: 14:46

## 2019-04-25 NOTE — OP
PROCEDURE DATE:  04/24/2019



PREOPERATIVE DIAGNOSIS:  Abdominal wall mass, possible malignancy.



POSTOPERATIVE DIAGNOSIS:  Abdominal wall mass, possible malignancy.



PROCEDURE DONE:

1.  Excision of abdominal wall mass, approximately 5 x 4 cm size.

2.  Excision of the redundant skin of the abdominal wall and subcutaneous

tissue, approximately 5 x 3 cm size.

3.  Layered closure of the wound complex, approximately 6 x 4 cm size.



TYPE OF ANESTHESIA:  Local anesthesia plus sedation.



ESTIMATED BLOOD LOSS:  Around 10 mL.



DRAINS:  None.



PATHOLOGY:  Abdominal wall lesion was sent to the pathology.



COMPLICATIONS:  None.



INTRAOPERATIVE FINDINGS:  The patient had a periumbilical large abdominal

wall mass, approximately 5 x 4 cm in size, side of the umbilicus.



DESCRIPTION OF PROCEDURE:  On intraoperative steps, this is a 74-year-old

female who had a past medical history of the breast cancer, and the patient

had abdominal wall mass with skin puckering, and the patient was consented

for the wide local excision of the mass, brought to the OR, and placed

supine on the operating table.  After induction of anesthesia, the abdomen

was prepped and draped in usual sterile fashion.  The local anesthesia was

injected.  Elliptical incision was made, and upper and lower flap was

created.  The redundant skin and subcutaneous tissue excised with mass, and

it was sent off the table for the pathology.  The medial, lateral,

superior, inferior dissection was done and mass was completed excised, and

the hemostasis was achieved.  The mass was not infiltrating into the

abdominal wall, and after proper hemostasis, the wound was irrigated.  Now,

the multilayer closure was done.  The upper and lower flaps were sutured

with underlying fascia as well as the deep subcu with 2-0 Vicryl, subcu

with a 3-0 Vicryl, and skin with a 4-0 Monocryl, and dry sterile dressing

was applied.  The patient tolerated the procedure well.  Count of

instrument and gauze was correct.  There was no apparent complication.  The

patient was extubated in OR and sent to the postanesthesia care unit in

stable condition.





__________________________________________

Landry Griffith MD





DD:  04/24/2019 16:36:55

DT:  04/25/2019 1:43:03

Job # [id number]

## 2019-07-02 NOTE — C.PDOC
[address] Dosing Services: Antimicrobial Stewardship Progress Note Consult for antibiotic dosing of aztreonam by Dr. Ulysses Newman Pharmacist reviewed antibiotic appropriateness for 69year old , male  for indication of UTI Day of Therapy: 1 Plan: 
 
Non-Kinetic Antimicrobial Dosing:  
Current Regimen:  Consult to pharmacy Recommendation:  
Will order aztreonam 2 g IV every 8 hours based on renal function Other Antimicrobial 
(not dosed by pharmacist) None Cultures 7/1 - 30,000 col - GNR, 2nd GNR - Prelim Serum Creatinine Lab Results Component Value Date/Time Creatinine 1.90 (H) 07/02/2019 05:00 AM  
 Creatinine (POC) 2.0 (H) 06/28/2019 02:15 PM  
 
   
Creatinine Clearance Estimated Creatinine Clearance: 70.6 mL/min (A) (based on SCr of 1.9 mg/dL (H)). Temp  
97.6 °F (36.4 °C) WBC Lab Results Component Value Date/Time WBC 5.5 07/02/2019 01:22 AM  
 
   
H/H Lab Results Component Value Date/Time HGB 6.8 (L) 07/02/2019 01:22 AM  
 
  
 
Platelets Lab Results Component Value Date/Time PLATELET 962 29/18/7025 01:22 AM  
 
  
 
 
Pharmacist: Signed Faustina Westbrook PHARMD Contact information:  History Of Present Illness


73 year old female with PMHx COPD, breast CA  (not currently on 

chemotherapy) for evaluation of malaise, cough. Patient reports she was exposed 

to her daughter in law who had URI at a wedding in [place]. Patient states 

upon returning home she started feeling sick, productive cough associated with 

post tussive emesis. Patient states she always feels SOB, has been using her 

inhaler and nebulizer at home with no relief. Patient denies fever, chills, 

nausea, diarrhea, rash, weakness, numbness, CP, palpitations. 


Time Seen by Provider: 18 00:56


Chief Complaint (Nursing): Shortness Of Breath


History Per: Patient


History/Exam Limitations: no limitations


Onset/Duration Of Symptoms: Days


Current Symptoms Are (Timing): Still Present


Initiating Event: Upper Respiratory Illness


Quality: "Pain"


Exacerbating Factor(s): Coughing


Current Respiratory Medications: See Home Med List


Associated Symptoms: Productive Cough.  denies: Fever, Chills


Recent travel outside of the [place]: No


Additional History Per: Patient





Past Medical History


Reviewed: Historical Data, Nursing Documentation, Vital Signs


Vital Signs: 





                                Last Vital Signs











Temp  98 F   18 01:03


 


Pulse  80   18 01:03


 


Resp  18   18 01:07


 


BP  125/85   18 01:03


 


Pulse Ox  97   18 01:07














- Medical History


PMH: Anxiety, Asthma, COPD, Fractures (hairline fx. foot no surgery), HTN, 

Hypercholesterolemia, Malignancy (RT BREAST CANCER), Osteoporosis, Peripheral 

Edema, Pneumonia (1964)


   Denies: Chronic Kidney Disease


Surgical History: No Surg Hx





- [facility] Procedures











INSERTION OF TOTALLY IMPLANTABLE VASC ACCESS DEVIC (14)


LYMPHATIC STRUCT BIOPSY (14)


PERCUTAN NEEDLE BIOPSY OF BREAST (14)


THORAX SFT TISS XRAY NEC (14)


UNILAT EXTEN SIMP MASTEC (10/09/14)








Family History: States: Unknown Family Hx





- Social History


Hx Tobacco Use: Yes


Hx Alcohol Use: Yes


Hx Substance Use: No





- Immunization History


Hx Tetanus Toxoid Vaccination: No


Hx Influenza Vaccination: No


Hx Pneumococcal Vaccination: No





Review Of Systems


Constitutional: Negative for: Fever, Chills


ENT: Negative for: Nose Discharge, Nose Congestion


Cardiovascular: Negative for: Chest Pain, Palpitations


Respiratory: Positive for: Cough, Shortness of Breath, Sputum


Gastrointestinal: Positive for: Vomiting.  Negative for: Nausea, Abdominal Pain,

Diarrhea


Skin: Negative for: Rash


Neurological: Negative for: Weakness, Numbness, Headache, Dizziness





Physical Exam





- Physical Exam


Appears: Non-toxic, No Acute Distress, Other (comfortable )


Skin: Normal Color, Warm, Dry


Head: Atraumatic, Tenderness


Eye(s): bilateral: Normal Inspection, PERRL, EOMI


Ear(s): Bilateral: Normal


Oral Mucosa: Moist


Throat: Normal, No Erythema, No Exudate


Neck: Normal ROM, Supple


Chest: Symmetrical


Cardiovascular: Rhythm Regular


Respiratory: Decreased Breath Sounds (bilateral lower lobes), No Rales, Rhonchi 

(scattered ), No Wheezing


Gastrointestinal/Abdominal: Bowel Sounds (active), Soft, No Tenderness, No Guar

ding, No Rebound


Extremity: Normal ROM, No Tenderness, No Swelling


Neurological/Psych: Oriented x3, Normal Speech, Normal Cognition


Gait: Steady





ED Course And Treatment





- Laboratory Results


Result Diagrams: 


                                 18 01:51





                                 18 01:51


ECG: Interpreted By Me, Viewed By Me


ECG Rhythm: Sinus Rhythm


Interpretation Of ECG: Normal axis, normal intervals, no ST elevation


Rate From EC (BPM)


O2 Sat by Pulse Oximetry: 97 (ON RA)


Pulse Ox Interpretation: Normal





- Radiology


CXR: Interpreted by Me, Viewed By Me


CXR Interpretation: Yes: Other (unchanged from prior)





Medical Decision Making


Medical Decision Making: 


Plan:


* EKG


* Labs


* CXR


* Solumedrol 125 mg IVP


* Nebulizer treatment 





Reassess: Patient still has decreased breath sounds, peak flow was 150. 


Patient will get another breathing treatment .


Tylenol was given patient c/o headache due to coughing. 


Will repeat CXR





Patent not improved after 3 breathing treatments, peak flow went down to 100, 

patient will be admitted for COPD exacerbation





04:32- paged Hospitalist Dr. Osborne cover for Dr. TUTU Gama who accepts the 

patient to be admitted to his service





Disposition





- Disposition


Referrals: 


Mainor Gama MD [Primary Care Provider] - 


Forms:  [facility] (English)





- Scribe Statement


The provider has reviewed the documentation as recorded by the Scribe


Tim Scott





All medical record entries made by the Scribe were at my direction and 

personally dictated by me. I have reviewed the chart and agree that the record 

accurately reflects my personal performance of the history, physical exam, 

medical decision making, and the department course for this patient. I have also

personally directed, reviewed, and agree with the discharge instructions and 

disposition.